# Patient Record
Sex: FEMALE | Race: WHITE | NOT HISPANIC OR LATINO | Employment: FULL TIME | URBAN - NONMETROPOLITAN AREA
[De-identification: names, ages, dates, MRNs, and addresses within clinical notes are randomized per-mention and may not be internally consistent; named-entity substitution may affect disease eponyms.]

---

## 2017-01-09 ENCOUNTER — OFFICE VISIT (OUTPATIENT)
Dept: OTOLARYNGOLOGY | Facility: CLINIC | Age: 47
End: 2017-01-09

## 2017-01-09 VITALS
WEIGHT: 181 LBS | BODY MASS INDEX: 35.53 KG/M2 | TEMPERATURE: 98.6 F | SYSTOLIC BLOOD PRESSURE: 111 MMHG | DIASTOLIC BLOOD PRESSURE: 85 MMHG | HEART RATE: 92 BPM | HEIGHT: 60 IN

## 2017-01-09 DIAGNOSIS — J30.9 ALLERGIC RHINITIS, UNSPECIFIED ALLERGIC RHINITIS TRIGGER, UNSPECIFIED RHINITIS SEASONALITY: Primary | ICD-10-CM

## 2017-01-09 PROCEDURE — 99213 OFFICE O/P EST LOW 20 MIN: CPT | Performed by: OTOLARYNGOLOGY

## 2017-01-09 RX ORDER — AZELASTINE 1 MG/ML
2 SPRAY, METERED NASAL 2 TIMES DAILY
Qty: 90 ML | Refills: 3 | Status: SHIPPED | OUTPATIENT
Start: 2017-01-09

## 2017-01-09 RX ORDER — NORTRIPTYLINE HYDROCHLORIDE 50 MG/1
50 CAPSULE ORAL NIGHTLY
COMMUNITY

## 2017-01-09 RX ORDER — FLUTICASONE PROPIONATE 50 MCG
2 SPRAY, SUSPENSION (ML) NASAL DAILY
Qty: 48 G | Refills: 3 | Status: SHIPPED | OUTPATIENT
Start: 2017-01-09 | End: 2017-02-08

## 2017-01-09 RX ORDER — HYDROCHLOROTHIAZIDE 12.5 MG/1
12.5 CAPSULE, GELATIN COATED ORAL DAILY
COMMUNITY

## 2017-01-09 RX ORDER — LISINOPRIL 10 MG/1
10 TABLET ORAL DAILY
COMMUNITY

## 2017-01-09 NOTE — LETTER
January 9, 2017     Ranjan Porter MD  [address] [facility] [address]    Patient: Marga Ladd   YOB: 1970   Date of Visit: 1/9/2017       Dear Dr. German MD:    Thank you for referring Marga Ladd to me for evaluation. Below are the relevant portions of my assessment and plan of care.         1. Allergic rhinitis, unspecified allergic rhinitis trigger, unspecified rhinitis seasonality                    If you have questions, please do not hesitate to call me. I look forward to following Marga along with you.         Sincerely,        Elliot Limon MD        CC: No Recipients

## 2017-01-09 NOTE — PROGRESS NOTES
PRIMARY CARE PROVIDER: Ranjan Porter MD  REFERRING PROVIDER: No ref. provider found    Chief Complaint   Patient presents with   • Follow-up       Subjective   History of Present Illness:  Marga Ladd is a  46 y.o.  female who is here for follow up. She has had no current complaints. She denies nasal congestion, drainage and sinusitis.    Review of Systems:  Review of Systems    Past History:  Past Medical History   Diagnosis Date   • Allergic rhinitis    • Deviated nasal septum    • Fibromyalgia    • Headache      Past Surgical History   Procedure Laterality Date   • Lymph node biopsy     • Hysterectomy     • Tubal abdominal ligation       Family History   Problem Relation Age of Onset   • Cancer Other    • Diabetes Other    • Heart disease Other      Social History   Substance Use Topics   • Smoking status: Never Smoker   • Smokeless tobacco: Never Used   • Alcohol use No       Current Outpatient Prescriptions:   •  hydrochlorothiazide (MICROZIDE) 12.5 MG capsule, Take 12.5 mg by mouth Daily., Disp: , Rfl:   •  lisinopril (PRINIVIL,ZESTRIL) 10 MG tablet, Take 10 mg by mouth Daily., Disp: , Rfl:   •  nortriptyline (PAMELOR) 50 MG capsule, Take 50 mg by mouth Every Night., Disp: , Rfl:   Allergies:  Prevacid [lansoprazole]    Objective     Vital Signs:  Temp:  [98.6 °F (37 °C)] 98.6 °F (37 °C)  Heart Rate:  [92] 92  BP: (111)/(85) 111/85    Physical Exam:  CONSTITUTIONAL: well nourished, well-developed, alert, oriented, in no acute distress   COMMUNICATION AND VOICE: able to communicate normally for age, normal voice quality  HEAD: normocephalic, no lesions, atraumatic, no tenderness, no masses   FACE: appearance normal, no lesions, no tenderness, no deformities, facial motion symmetric  EYES: ocular motility normal, eyelids normal, orbits normal, no proptosis, conjunctiva normal , pupils equal, round   EARS:  Hearing: response to conversational voice normal bilaterally   External Ears: auricles without  lesions  NOSE:  External Nose: structure normal, no tenderness on palpation, no nasal discharge, no lesions, no evidence of trauma, nostrils patent   Intranasal exam: nasal mucosa with mucosal congestion and erythema, nasal septum relatively midline   ORAL:  Lips: upper and lower lips without lesion   NECK: neck appearance normal  CHEST/RESPIRATORY: respiratory effort normal, normal chest excursion  CARDIOVASCULAR: extremities without cyanosis or edema   NEUROLOGIC/PSYCHIATRIC: oriented appropriately, mood normal, affect appropriate, CN II-XII intact grossly      Assessment   1. Allergic rhinitis, unspecified allergic rhinitis trigger, unspecified rhinitis seasonality          Plan     -------MEDICATIONS:-------  continue medication as previously prescribed  fluticisone 2 puff qd  azelastine 2 puff bid     -------TESTING--------  none    Follow up in 6months    Elliot Limon MD  01/09/17  4:19 PM

## 2017-01-09 NOTE — MR AVS SNAPSHOT
"                        Marga Ladd   1/9/2017 3:30 PM   Office Visit    Dept Phone:  [phone]   Encounter #:  [id number]    Provider:  Elliot Limon MD   Department:  [facility]                Your Full Care Plan              Your Updated Medication List          This list is accurate as of: 1/9/17  4:27 PM.  Always use your most recent med list.                hydrochlorothiazide 12.5 MG capsule   Commonly known as:  MICROZIDE       lisinopril 10 MG tablet   Commonly known as:  PRINIVIL,ZESTRIL       nortriptyline 50 MG capsule   Commonly known as:  PAMELOR               You Were Diagnosed With        Codes Comments    Allergic rhinitis, unspecified allergic rhinitis trigger, unspecified rhinitis seasonality    -  Primary ICD-10-CM: J30.9  ICD-9-CM: 477.9       Instructions     None    Patient Instructions History      Upcoming Appointments     Visit Type Date Time Department    FOLLOW UP 1/9/2017  3:30 PM [facility]    FOLLOW UP 7/10/2017  1:00 PM [facility]      MyChart Signup     Our records indicate that you have declined [facility] [employer] [facility] signup. If you would like to sign up for [employer], please email [email or web address] or call [phone] to obtain an activation code.             Other Info from Your Visit           Your Appointments     Jul 10, 2017  1:00 PM CDT   Follow Up with Elliot Limon MD   [facility] (--)    [address] [facility] [address]   [phone]           Arrive 15 minutes prior to appointment.              Allergies     Prevacid [Lansoprazole]        Reason for Visit     Follow-up           Vital Signs     Blood Pressure Pulse Temperature Height Weight Body Mass Index    111/85 (Patient Position: Sitting) 92 98.6 °F (37 °C) 60\" (152.4 cm) 181 lb (82.1 kg) 35.35 kg/m2    Smoking Status                   Never Smoker           Problems and Diagnoses Noted     Nasal " inflammation due to allergen